# Patient Record
Sex: FEMALE | Race: WHITE | Employment: UNEMPLOYED | URBAN - METROPOLITAN AREA
[De-identification: names, ages, dates, MRNs, and addresses within clinical notes are randomized per-mention and may not be internally consistent; named-entity substitution may affect disease eponyms.]

---

## 2017-01-12 ENCOUNTER — OFFICE VISIT (OUTPATIENT)
Dept: FAMILY MEDICINE | Facility: CLINIC | Age: 47
End: 2017-01-12
Payer: COMMERCIAL

## 2017-01-12 VITALS
DIASTOLIC BLOOD PRESSURE: 70 MMHG | WEIGHT: 169.1 LBS | SYSTOLIC BLOOD PRESSURE: 122 MMHG | TEMPERATURE: 98.5 F | HEIGHT: 66 IN | BODY MASS INDEX: 27.18 KG/M2 | OXYGEN SATURATION: 98 % | HEART RATE: 63 BPM

## 2017-01-12 DIAGNOSIS — J06.9 VIRAL URI: Primary | ICD-10-CM

## 2017-01-12 PROCEDURE — 99213 OFFICE O/P EST LOW 20 MIN: CPT | Performed by: FAMILY MEDICINE

## 2017-01-12 NOTE — PROGRESS NOTES
"  SUBJECTIVE:                                                    Jess Heaton is a 46 year old female who presents to clinic today for the following health issues:    Acute Illness   Acute illness concerns: sinus, fever  Onset: Tuesday      Fever: YES    Chills/Sweats: YES    Headache (location?): YES    Sinus Pressure:YES    Conjunctivitis:       Ear Pain: YES:     Rhinorrhea: no    Congestion: YES    Sore Throat: YES     Cough: YES-non-productive    Wheeze: no     Decreased Appetite: no    Nausea: yes    Vomiting: no    Diarrhea:  YES    Dysuria/Freq.: no    Fatigue/Achiness: YES    Sick/Strep Exposure: no     Therapies Tried and outcome:       Notes she seems to get this yearly, usually waits until her symptoms are bad. Daughter had flu like symptoms recently. Claims she usually is diagnosed with a sinus infection, treated with azithromycin.     Says she want to \"get a jump on\" her symptoms. Using nasal saline washes, sudafed.       Problem list and histories reviewed & adjusted, as indicated.  Additional history: none    Problem list, Medication list, Allergies, and Medical/Social/Surgical histories reviewed in EPIC and updated as appropriate.    ROS:  Constitutional, HEENT, cardiovascular, pulmonary, gi and gu systems are negative, except as otherwise noted.    OBJECTIVE:                                                    /70 mmHg  Pulse 63  Temp(Src) 98.5  F (36.9  C) (Oral)  Ht 5' 6\" (1.676 m)  Wt 169 lb 1.6 oz (76.703 kg)  BMI 27.31 kg/m2  SpO2 98%  Body mass index is 27.31 kg/(m^2).  GENERAL: healthy, alert and no distress  EYES: Eyes grossly normal to inspection, conjunctivae and sclerae normal  HENT: mild serous effusion bilaterally, no maxillary sinus pressure to percussion. ear canals and TM's normal, nose and mouth without ulcers or lesions  NECK: no adenopathy  RESP: good air entry, lung sounds symmetric, lungs clear to auscultation - no rales, rhonchi or wheezes  CV: regular rate and " rhythm, normal S1 S2, no S3 or S4, no murmur, click or rub    Diagnostic Test Results:  none      ASSESSMENT/PLAN:                                                      1. Viral URI - suggested continued symptomatic treatment with above agents. Call should symptoms worsen. If symptoms are the same early next week, no need to treat with abx. If symptoms worsen, would treat with z-pack as she requested.       Jasmyne Maxwell MD  [facility]

## 2017-01-12 NOTE — NURSING NOTE
"Chief Complaint   Patient presents with     Sinus Problem       Initial /70 mmHg  Pulse 63  Temp(Src) 98.5  F (36.9  C) (Oral)  Ht 5' 6\" (1.676 m)  Wt 169 lb 1.6 oz (76.703 kg)  BMI 27.31 kg/m2  SpO2 98% Estimated body mass index is 27.31 kg/(m^2) as calculated from the following:    Height as of this encounter: 5' 6\" (1.676 m).    Weight as of this encounter: 169 lb 1.6 oz (76.703 kg).  BP completed using cuff size: regular [facility]    Health maintenance   Pt. Advised mammogram and pap are due       "

## 2017-06-10 ENCOUNTER — HEALTH MAINTENANCE LETTER (OUTPATIENT)
Age: 47
End: 2017-06-10

## 2018-06-16 ENCOUNTER — HEALTH MAINTENANCE LETTER (OUTPATIENT)
Age: 48
End: 2018-06-16